# Patient Record
Sex: FEMALE | Race: WHITE | NOT HISPANIC OR LATINO | URBAN - NONMETROPOLITAN AREA
[De-identification: names, ages, dates, MRNs, and addresses within clinical notes are randomized per-mention and may not be internally consistent; named-entity substitution may affect disease eponyms.]

---

## 2017-07-27 PROBLEM — H52.13: Noted: 2017-07-27

## 2017-07-27 PROBLEM — H52.4: Noted: 2020-12-10

## 2017-07-27 PROBLEM — H52.223: Noted: 2017-07-27

## 2020-12-10 ENCOUNTER — IMPORTED ENCOUNTER (OUTPATIENT)
Dept: URBAN - NONMETROPOLITAN AREA CLINIC 1 | Facility: CLINIC | Age: 40
End: 2020-12-10

## 2020-12-10 PROBLEM — H52.13: Noted: 2017-07-27

## 2020-12-10 PROBLEM — H52.4: Noted: 2020-12-10

## 2020-12-10 PROBLEM — H52.223: Noted: 2017-07-27

## 2020-12-10 PROCEDURE — 92015 DETERMINE REFRACTIVE STATE: CPT

## 2020-12-10 PROCEDURE — 92014 COMPRE OPH EXAM EST PT 1/>: CPT

## 2020-12-10 NOTE — PATIENT DISCUSSION
Compound Myopic Astigmatism OU w/Presbyopia-  discussed findings w/ patient-  mild changes noted at this time-  new spectacle Rx issued today-  monitor yearly or prnChoroidal Nevus OS-  discussed findings w/patient-  around [address] 73-  flat at this time-  continue to monitor yearly or prn; 's Notes: MR 12/10/2020DFE 12/10/2020

## 2022-04-09 ASSESSMENT — VISUAL ACUITY
OU_CC: J1+
OU_SC: 20/25+
OS_SC: 20/30+2
OD_SC: 20/30

## 2022-04-09 ASSESSMENT — TONOMETRY
OS_IOP_MMHG: 15
OD_IOP_MMHG: 15

## 2023-09-01 ENCOUNTER — COMPREHENSIVE EXAM (OUTPATIENT)
Dept: RURAL CLINIC 1 | Facility: CLINIC | Age: 43
End: 2023-09-01

## 2023-09-01 DIAGNOSIS — H52.13: ICD-10-CM

## 2023-09-01 DIAGNOSIS — H52.4: ICD-10-CM

## 2023-09-01 DIAGNOSIS — H52.223: ICD-10-CM

## 2023-09-01 PROCEDURE — 92014 COMPRE OPH EXAM EST PT 1/>: CPT

## 2023-09-01 PROCEDURE — 92015 DETERMINE REFRACTIVE STATE: CPT

## 2023-09-01 ASSESSMENT — VISUAL ACUITY
OS_CC: 20/30
OS_CC: 20/50
OD_CC: 20/25
OU_CC: 20/20
OU_CC: 20/30
OD_CC: 20/30

## 2023-09-01 ASSESSMENT — TONOMETRY
OS_IOP_MMHG: 15
OD_IOP_MMHG: 15

## 2025-07-21 ENCOUNTER — COMPREHENSIVE EXAM (OUTPATIENT)
Age: 45
End: 2025-07-21

## 2025-07-21 DIAGNOSIS — H52.4: ICD-10-CM

## 2025-07-21 DIAGNOSIS — H52.13: ICD-10-CM

## 2025-07-21 DIAGNOSIS — H52.223: ICD-10-CM

## 2025-07-21 PROCEDURE — 92014 COMPRE OPH EXAM EST PT 1/>: CPT

## 2025-07-21 PROCEDURE — 92015 DETERMINE REFRACTIVE STATE: CPT
